# Patient Record
Sex: MALE
[De-identification: names, ages, dates, MRNs, and addresses within clinical notes are randomized per-mention and may not be internally consistent; named-entity substitution may affect disease eponyms.]

---

## 2018-03-05 ENCOUNTER — HOSPITAL ENCOUNTER (EMERGENCY)
Dept: HOSPITAL 82 - ED | Age: 58
Discharge: HOME | DRG: 552 | End: 2018-03-05
Payer: COMMERCIAL

## 2018-03-05 VITALS — WEIGHT: 271.17 LBS | HEIGHT: 67 IN | BODY MASS INDEX: 42.56 KG/M2

## 2018-03-05 VITALS — DIASTOLIC BLOOD PRESSURE: 85 MMHG | SYSTOLIC BLOOD PRESSURE: 133 MMHG
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
## 2018-03-05 DIAGNOSIS — M51.36: Primary | ICD-10-CM
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
## 2021-01-12 ENCOUNTER — HOSPITAL ENCOUNTER (OUTPATIENT)
Dept: HOSPITAL 82 - ED | Age: 61
Setting detail: OBSERVATION
LOS: 1 days | Discharge: HOME | DRG: 177 | End: 2021-01-13
Attending: INTERNAL MEDICINE | Admitting: INTERNAL MEDICINE
Payer: COMMERCIAL

## 2021-01-12 VITALS — HEIGHT: 67 IN | BODY MASS INDEX: 43.67 KG/M2 | WEIGHT: 278.25 LBS

## 2021-01-12 VITALS — DIASTOLIC BLOOD PRESSURE: 83 MMHG | SYSTOLIC BLOOD PRESSURE: 134 MMHG

## 2021-01-12 DIAGNOSIS — G47.30: ICD-10-CM

## 2021-01-12 DIAGNOSIS — U07.1: Primary | ICD-10-CM

## 2021-01-12 DIAGNOSIS — J12.82: ICD-10-CM

## 2021-01-12 DIAGNOSIS — I10: ICD-10-CM

## 2021-01-12 LAB
ALBUMIN SERPL-MCNC: 4.1 G/DL (ref 3.2–5)
ALP SERPL-CCNC: 58 U/L (ref 38–126)
ANION GAP SERPL CALCULATED.3IONS-SCNC: 12 MMOL/L
AST SERPL-CCNC: 48 U/L (ref 19–48)
BASOPHILS NFR BLD AUTO: 0 % (ref 0–3)
BILIRUB UR QL STRIP.AUTO: (no result)
BUN SERPL-MCNC: 15 MG/DL (ref 8–23)
BUN/CREAT SERPL: 20
CHLORIDE SERPL-SCNC: 102 MMOL/L (ref 95–108)
CO2 SERPL-SCNC: 25 MMOL/L (ref 22–30)
COLOR UR AUTO: (no result)
CREAT SERPL-MCNC: 0.8 MG/DL (ref 0.7–1.3)
EOSINOPHIL NFR BLD AUTO: 0 % (ref 0–8)
ERYTHROCYTE [DISTWIDTH] IN BLOOD BY AUTOMATED COUNT: 12.2 % (ref 11.5–15.5)
GLUCOSE UR STRIP.AUTO-MCNC: NEGATIVE MG/DL
HCT VFR BLD AUTO: 38.7 % (ref 39–50)
HGB BLD-MCNC: 13.3 G/DL (ref 14–18)
HGB UR QL STRIP.AUTO: (no result)
IMM GRANULOCYTES NFR BLD: 0.6 % (ref 0–5)
KETONES UR STRIP.AUTO-MCNC: 15 MG/DL
LEUKOCYTE ESTERASE UR QL STRIP.AUTO: NEGATIVE
LYMPHOCYTES NFR BLD: 17 % (ref 15–41)
MCH RBC QN AUTO: 31.7 PG  CALC (ref 26–32)
MCHC RBC AUTO-ENTMCNC: 34.4 G/DL CAL (ref 32–36)
MCV RBC AUTO: 92.4 FL  CALC (ref 80–100)
MONOCYTES NFR BLD AUTO: 6 % (ref 2–13)
MYOGLOBIN SERPL-MCNC: 183 NG/ML (ref 0–121)
NEUTROPHILS # BLD AUTO: 5.4 THOU/UL (ref 1.82–7.42)
NEUTROPHILS NFR BLD AUTO: 76 % (ref 42–76)
NITRITE UR QL STRIP.AUTO: NEGATIVE
PH UR STRIP.AUTO: 6 [PH] (ref 4.5–8)
PLATELET # BLD AUTO: 227 THOU/UL (ref 130–400)
POTASSIUM SERPL-SCNC: 3.8 MMOL/L (ref 3.5–5.1)
PROT SERPL-MCNC: 8 G/DL (ref 6.3–8.2)
PROT UR QL STRIP.AUTO: 100 MG/DL
RBC # BLD AUTO: 4.19 MILL/UL (ref 4.7–6.1)
RBC #/AREA URNS HPF: (no result) RBC/HPF (ref 0–5)
SERVICE CMNT 15-IMP: (no result) HPF
SODIUM SERPL-SCNC: 136 MMOL/L (ref 137–146)
SP GR UR STRIP.AUTO: >=1.03
SPERM URNS QL MICRO: (no result) HPF
UROBILINOGEN UR QL STRIP.AUTO: 2 E.U./DL
WBC #/AREA URNS HPF: (no result) WBC/HPF (ref 0–5)

## 2021-01-12 PROCEDURE — G0378 HOSPITAL OBSERVATION PER HR: HCPCS

## 2021-01-13 VITALS — DIASTOLIC BLOOD PRESSURE: 84 MMHG | SYSTOLIC BLOOD PRESSURE: 133 MMHG

## 2021-01-13 VITALS — SYSTOLIC BLOOD PRESSURE: 119 MMHG | DIASTOLIC BLOOD PRESSURE: 70 MMHG

## 2022-02-19 ENCOUNTER — HOSPITAL ENCOUNTER (OUTPATIENT)
Dept: HOSPITAL 82 - ED | Age: 62
Setting detail: OBSERVATION
LOS: 5 days | Discharge: HOME | DRG: 418 | End: 2022-02-24
Attending: STUDENT IN AN ORGANIZED HEALTH CARE EDUCATION/TRAINING PROGRAM | Admitting: STUDENT IN AN ORGANIZED HEALTH CARE EDUCATION/TRAINING PROGRAM
Payer: SELF-PAY

## 2022-02-19 VITALS — HEIGHT: 67 IN | WEIGHT: 273.37 LBS | BODY MASS INDEX: 42.91 KG/M2

## 2022-02-19 VITALS — SYSTOLIC BLOOD PRESSURE: 120 MMHG | DIASTOLIC BLOOD PRESSURE: 76 MMHG

## 2022-02-19 DIAGNOSIS — G47.30: ICD-10-CM

## 2022-02-19 DIAGNOSIS — I95.9: ICD-10-CM

## 2022-02-19 DIAGNOSIS — K81.0: Primary | ICD-10-CM

## 2022-02-19 DIAGNOSIS — Z20.822: ICD-10-CM

## 2022-02-19 DIAGNOSIS — E03.9: ICD-10-CM

## 2022-02-19 DIAGNOSIS — I10: ICD-10-CM

## 2022-02-19 DIAGNOSIS — E66.9: ICD-10-CM

## 2022-02-19 LAB
ALBUMIN SERPL-MCNC: 4.5 G/DL (ref 3.2–5)
ALP SERPL-CCNC: 67 U/L (ref 38–126)
AMYLASE SERPL-CCNC: 118 U/L (ref 30–110)
ANION GAP SERPL CALCULATED.3IONS-SCNC: 15 MMOL/L
AST SERPL-CCNC: 30 U/L (ref 19–48)
BASOPHILS NFR BLD AUTO: 0 % (ref 0–3)
BILIRUB UR QL STRIP.AUTO: NEGATIVE
BUN SERPL-MCNC: 12 MG/DL (ref 8–23)
BUN/CREAT SERPL: 15
CHLORIDE SERPL-SCNC: 103 MMOL/L (ref 95–108)
CO2 SERPL-SCNC: 25 MMOL/L (ref 22–30)
COLOR UR AUTO: YELLOW
CREAT SERPL-MCNC: 0.8 MG/DL (ref 0.7–1.3)
EOSINOPHIL NFR BLD AUTO: 0 % (ref 0–8)
ERYTHROCYTE [DISTWIDTH] IN BLOOD BY AUTOMATED COUNT: 12.5 % (ref 11.5–15.5)
GLUCOSE UR STRIP.AUTO-MCNC: NEGATIVE MG/DL
HCT VFR BLD AUTO: 46.4 % (ref 39–50)
HGB BLD-MCNC: 15.3 G/DL (ref 14–18)
HGB UR QL STRIP.AUTO: (no result)
IMM GRANULOCYTES NFR BLD: 0.2 % (ref 0–5)
KETONES UR STRIP.AUTO-MCNC: NEGATIVE MG/DL
LEUKOCYTE ESTERASE UR QL STRIP.AUTO: NEGATIVE
LIPASE SERPL-CCNC: 150 U/L (ref 23–300)
LYMPHOCYTES NFR BLD: 9 % (ref 15–41)
MCH RBC QN AUTO: 31.9 PG  CALC (ref 26–32)
MCHC RBC AUTO-ENTMCNC: 33 G/DL CAL (ref 32–36)
MCV RBC AUTO: 96.9 FL  CALC (ref 80–100)
MONOCYTES NFR BLD AUTO: 7 % (ref 2–13)
NEUTROPHILS # BLD AUTO: 15.11 THOU/UL (ref 1.82–7.42)
NEUTROPHILS NFR BLD AUTO: 84 % (ref 42–76)
NITRITE UR QL STRIP.AUTO: NEGATIVE
PH UR STRIP.AUTO: 6.5 [PH] (ref 4.5–8)
PLATELET # BLD AUTO: 235 THOU/UL (ref 130–400)
POTASSIUM SERPL-SCNC: 4 MMOL/L (ref 3.5–5.1)
PROT SERPL-MCNC: 9 G/DL (ref 6.3–8.2)
PROT UR QL STRIP.AUTO: (no result) MG/DL
RBC # BLD AUTO: 4.79 MILL/UL (ref 4.7–6.1)
RBC #/AREA URNS HPF: (no result) RBC/HPF (ref 0–5)
SODIUM SERPL-SCNC: 139 MMOL/L (ref 137–146)
SP GR UR STRIP.AUTO: 1.01
UROBILINOGEN UR QL STRIP.AUTO: 1 E.U./DL
WBC #/AREA URNS HPF: (no result) WBC/HPF (ref 0–5)

## 2022-02-19 PROCEDURE — A9537 TC99M MEBROFENIN: HCPCS

## 2022-02-19 PROCEDURE — G0378 HOSPITAL OBSERVATION PER HR: HCPCS

## 2022-02-19 NOTE — NUR
PATIENT TO ROOM VIA WHEELCHAIR ACCOMPANIED BY ER NURSE. PATIENT TRANSFERRED TO
BED WITH MINIMAL ASSISTANCE. PT IS ALERT AND ORIENTED X3. ADMISSION ASSESSMENT
COMPLETED AT THIS TIME. IV PATENT X1. ORIENTED PATIENT TO ROOM AND UNIT. CALL
LIGHT IN REACH. WILL CONTINUE TO MONITOR.

## 2022-02-20 VITALS — DIASTOLIC BLOOD PRESSURE: 33 MMHG | SYSTOLIC BLOOD PRESSURE: 87 MMHG

## 2022-02-20 VITALS — DIASTOLIC BLOOD PRESSURE: 59 MMHG | SYSTOLIC BLOOD PRESSURE: 108 MMHG

## 2022-02-20 VITALS — DIASTOLIC BLOOD PRESSURE: 73 MMHG | SYSTOLIC BLOOD PRESSURE: 117 MMHG

## 2022-02-20 LAB
ANION GAP SERPL CALCULATED.3IONS-SCNC: 14 MMOL/L
BUN SERPL-MCNC: 15 MG/DL (ref 8–23)
BUN/CREAT SERPL: 18
CHLORIDE SERPL-SCNC: 103 MMOL/L (ref 95–108)
CO2 SERPL-SCNC: 26 MMOL/L (ref 22–30)
CREAT SERPL-MCNC: 0.9 MG/DL (ref 0.7–1.3)
ERYTHROCYTE [DISTWIDTH] IN BLOOD BY AUTOMATED COUNT: 12.6 % (ref 11.5–15.5)
HCT VFR BLD AUTO: 42.1 % (ref 39–50)
HGB BLD-MCNC: 13.6 G/DL (ref 14–18)
MAGNESIUM SERPL-MCNC: 2.2 MG/DL (ref 1.6–2.3)
MCH RBC QN AUTO: 31.6 PG  CALC (ref 26–32)
MCHC RBC AUTO-ENTMCNC: 32.3 G/DL CAL (ref 32–36)
MCV RBC AUTO: 97.9 FL  CALC (ref 80–100)
PLATELET # BLD AUTO: 235 THOU/UL (ref 130–400)
POTASSIUM SERPL-SCNC: 4 MMOL/L (ref 3.5–5.1)
RBC # BLD AUTO: 4.3 MILL/UL (ref 4.7–6.1)
SODIUM SERPL-SCNC: 139 MMOL/L (ref 137–146)

## 2022-02-20 NOTE — NUR
SHIFT CHANGE REPORT, PT AWAKE ALERT AND ORIENTED, C/O ACHING ABD PAIN @ 3/10
TO RIGHT ABD, IVF INFUSING, CALL BELL IN REACH AND BED LOCKED IN LOWEST
POSITION.

## 2022-02-20 NOTE — NUR
PT IC FLUIDS PAUSED, PT WOULD LIKE TO TAKE A SHOWER. IV SITE COVERED WITH
AQUAGUARD, BATHROOM SET UP, TOWELS PROVIDED, AND TOILETRIES. ORITNETED PT TO
BATHROOM CALL LIGHT SYSTEM AND ISTRUCTED TO CALL IF HELP IS NEEDED.

## 2022-02-20 NOTE — NUR
PT RESTING IN BED WITH EYES CLOSED, RESPIRATIONS ARE EQUAL AND NON-LABORED, NO
S/SX OF DISTRESS OR DISCOMFORT NOTED, CALL LIGHT WITHIN REACH, WILL CONTINUE
TO MONITOR.

## 2022-02-21 VITALS — DIASTOLIC BLOOD PRESSURE: 75 MMHG | SYSTOLIC BLOOD PRESSURE: 95 MMHG

## 2022-02-21 VITALS — DIASTOLIC BLOOD PRESSURE: 63 MMHG | SYSTOLIC BLOOD PRESSURE: 115 MMHG

## 2022-02-21 VITALS — SYSTOLIC BLOOD PRESSURE: 125 MMHG | DIASTOLIC BLOOD PRESSURE: 71 MMHG

## 2022-02-21 LAB
ANION GAP SERPL CALCULATED.3IONS-SCNC: 13 MMOL/L
BUN SERPL-MCNC: 16 MG/DL (ref 8–23)
BUN/CREAT SERPL: 20
CHLORIDE SERPL-SCNC: 108 MMOL/L (ref 95–108)
CO2 SERPL-SCNC: 23 MMOL/L (ref 22–30)
CREAT SERPL-MCNC: 0.8 MG/DL (ref 0.7–1.3)
ERYTHROCYTE [DISTWIDTH] IN BLOOD BY AUTOMATED COUNT: 12.4 % (ref 11.5–15.5)
HCT VFR BLD AUTO: 44.5 % (ref 39–50)
HGB BLD-MCNC: 14.9 G/DL (ref 14–18)
MAGNESIUM SERPL-MCNC: 2.4 MG/DL (ref 1.6–2.3)
MCH RBC QN AUTO: 32.6 PG  CALC (ref 26–32)
MCHC RBC AUTO-ENTMCNC: 33.5 G/DL CAL (ref 32–36)
MCV RBC AUTO: 97.4 FL  CALC (ref 80–100)
PLATELET # BLD AUTO: 187 THOU/UL (ref 130–400)
POTASSIUM SERPL-SCNC: 3.8 MMOL/L (ref 3.5–5.1)
RBC # BLD AUTO: 4.57 MILL/UL (ref 4.7–6.1)
SODIUM SERPL-SCNC: 140 MMOL/L (ref 137–146)

## 2022-02-21 NOTE — NUR
RECEIVE REPORT FROM ARASELI PERALTA. PATIENT ALERT AND ORIENTED. NO RESPIRATORY
DISTRESS, NO PAIN AT THIS TIME. EDUCATED PATIENT ABOUT MEDICATIONS AND PLAN OF
CARE. PATIENT REFER UNDERSTAND.

## 2022-02-21 NOTE — NUR
PATIENT RESTING IN BED QUIETLY. NO COMPLAINTS VOICED AT THIS TIME. NEW BAG OF
IVF HUNG. IV ZOSYN HUNG PER ORDERS AS WELL. CALL LIGHT AND BELONGINGS REMAIN
IN REACH.

## 2022-02-21 NOTE — NUR
ANTIBIOTIC AND NEW BAG OF NORMAL SALINE HUNG AT THIS TIME. PT DENIES ANY
CURRENT PAIN, EDUCATION PROVIDED REGARDING THE USE OF ANTIBIOTICS. CALL LIGHT
AND BEDSIDE TABLE WITHIN REACH.

## 2022-02-21 NOTE — NUR
PATIENT ALERT AND ORIENTED. Bengali SPEAKING BUT IS ABLE TO UNDERSTAND SOME
ENGLISH. ASSESSMENT COMPLETE. RATES PAIN OF 2 BUT REFUSED PAIN MEDICATION.
PAIN LOCATED LOWER RIGHT ABDOMEN AND RADIATES TO THE BACK. NO SIGNS OF
DISTRESS. PATIENT REMAINS NPO PER ORDERS. IV FLUIDS REMAIN INFUSING PER
ORDERS. CALL LIGHT AND BELONGINGS REMAIN IN REACH.

## 2022-02-22 VITALS — SYSTOLIC BLOOD PRESSURE: 140 MMHG | DIASTOLIC BLOOD PRESSURE: 77 MMHG

## 2022-02-22 VITALS — DIASTOLIC BLOOD PRESSURE: 63 MMHG | SYSTOLIC BLOOD PRESSURE: 118 MMHG

## 2022-02-22 LAB
ANION GAP SERPL CALCULATED.3IONS-SCNC: 12 MMOL/L
BUN SERPL-MCNC: 12 MG/DL (ref 8–23)
BUN/CREAT SERPL: 17
CHLORIDE SERPL-SCNC: 108 MMOL/L (ref 95–108)
CO2 SERPL-SCNC: 22 MMOL/L (ref 22–30)
CREAT SERPL-MCNC: 0.7 MG/DL (ref 0.7–1.3)
ERYTHROCYTE [DISTWIDTH] IN BLOOD BY AUTOMATED COUNT: 12.2 % (ref 11.5–15.5)
HCT VFR BLD AUTO: 38.5 % (ref 39–50)
HGB BLD-MCNC: 12.7 G/DL (ref 14–18)
MCH RBC QN AUTO: 32.1 PG  CALC (ref 26–32)
MCHC RBC AUTO-ENTMCNC: 33 G/DL CAL (ref 32–36)
MCV RBC AUTO: 97.2 FL  CALC (ref 80–100)
PLATELET # BLD AUTO: 241 THOU/UL (ref 130–400)
POTASSIUM SERPL-SCNC: 3.9 MMOL/L (ref 3.5–5.1)
RBC # BLD AUTO: 3.96 MILL/UL (ref 4.7–6.1)
SODIUM SERPL-SCNC: 139 MMOL/L (ref 137–146)

## 2022-02-22 NOTE — NUR
PT SITTING IN BED. A&O X3. NO DISTRESS NOTED. PT DENIES ANY PAIN OR N&V. DOES
STATE PAIN IS BETTER COMPARED TO YESTERDAY ALTHOUGH STILL C/O OF SLIGHT
DISCOMFORT IN RLQ. CLEAR BREATH SOUNDS UPON AUSUCLTATION. ACTIVE BOWEL SOUNDS
X4 QUADRANTS. IV HEALTHY AND PATENT WITH IVF INFUSING PER MAR ORDER. NO OTHER
NEEDS AT THIS TIME. ASSESSMENT COMPLETED. DISCUSSED POC. CALL LIGHT WITHIN
REACH. Quality 431: Preventive Care And Screening: Unhealthy Alcohol Use - Screening: Patient screened for unhealthy alcohol use using a single question and scores less than 2 times per year Quality 110: Preventive Care And Screening: Influenza Immunization: Influenza Immunization previously received during influenza season Quality 226: Preventive Care And Screening: Tobacco Use: Screening And Cessation Intervention: Patient screened for tobacco use and is an ex/non-smoker Detail Level: Detailed Quality 130: Documentation Of Current Medications In The Medical Record: Current Medications Documented

## 2022-02-22 NOTE — NUR
PATIENT RESTING IN BED ON HIS RIGHT SIDE. DENIES ANY PAIN. NO SIGNS OF
DISTRESS. ASSESSMENT COMPLETE. NO BOWEL MOVEMENT NOTED. NO COMPLAINTS OF
NAUSEA. NO TENDERNESS TO ABDOMEN. CALL LIGHT AND BELONGINGS REMAIN IN REACH.

## 2022-02-22 NOTE — NUR
PT SITTING ON THE SIDE OF THE BED EATING DINNER. PT TOLERATING DINNER WELL
DENIES ANY PAIN. CALL LIGHT WITHIN REACH.

## 2022-02-22 NOTE — NUR
RESTING IN BED. NO COMPLAINTS VOICED AT THIS TIME. CALL LIGHT AND BELONGINGS
REMAIN IN PATIENTS REACH.

## 2022-02-22 NOTE — NUR
PER GLORIA IN [place], PT ALLOWED TO HAVE BREAKFAST IF PERMITTED; NUCLEAR MED DOSE
NOT AVAILABLE AT THIS TIME; PT TO HAVE SCAN COMPLETED AT OR AFTER 2 PM. PT
REQUIRED TO BE NPO 4-6 HOURS PRIOR TO SCAN; NO PAIN MEDICATION ALSO FOR 4-6
HOURS PRIOR TO SCAN.

## 2022-02-23 VITALS — SYSTOLIC BLOOD PRESSURE: 133 MMHG | DIASTOLIC BLOOD PRESSURE: 78 MMHG

## 2022-02-23 VITALS — SYSTOLIC BLOOD PRESSURE: 134 MMHG | DIASTOLIC BLOOD PRESSURE: 74 MMHG

## 2022-02-23 VITALS — SYSTOLIC BLOOD PRESSURE: 148 MMHG | DIASTOLIC BLOOD PRESSURE: 80 MMHG

## 2022-02-23 VITALS — SYSTOLIC BLOOD PRESSURE: 132 MMHG | DIASTOLIC BLOOD PRESSURE: 79 MMHG

## 2022-02-23 NOTE — NUR
PATIENT RESTING IN BED. NO COMPLAINTS VOICED AT THIS TIME. NO SIGNS OF
DISTRESS. CALL LIGHT AND BELONGINGS REMAIN IN REACH.

## 2022-02-23 NOTE — NUR
PATIENT RESTING IN BED. NO COMPLAINTS. IV ABT HUNG PER ORDERS. PATIENT
AMBULATED TO RESTROOM WITHOUT DIFFICULTY. CALL LIGHT AND BELONGINGS REMAIN IN
REACH.

## 2022-02-23 NOTE — NUR
DR AGUILAR ROUNDED AND DISCUSED OPTIONS FOR TREATMENT WITH PT,  IN
ROOM AND TRANSLATED, PT STATED UNDERSTANDING AND DECIDED TO HAVE SURGERY DONE
IN AM, WILL CONTINUE TO MONITOR.

## 2022-02-23 NOTE — NUR
SHIFT CHANGE REPORT, PT AWAKE ALERT AND ORIENTED, DENIES PAIN, IVF INFUSING,
ALL NEEDS ADDRESSED, CALL BELL IN REACH AND BED LOCKED IN LOWEST POSITION.

## 2022-02-23 NOTE — NUR
DR AGUILAR JUST CALLED [facility] STAFF HIDA SCAN WAS POSITIVE AND IF PT WANTS
TO HAVE SURGERY HERE IN AM HE WILL HAVE IT DONE BUT IF HE WANTS TO GO HOME AND
SEE HIM IN OFICE HE HAS THAT OPTION, PT INFORMED OF PLAN AND DECIDED TO GO
HOME, MONITOR/MODIFY HIS DIET AND FOLLOW UP AS INSTRUCTED/NEEDED.

## 2022-02-24 VITALS — DIASTOLIC BLOOD PRESSURE: 78 MMHG | SYSTOLIC BLOOD PRESSURE: 133 MMHG

## 2022-02-24 VITALS — SYSTOLIC BLOOD PRESSURE: 134 MMHG | DIASTOLIC BLOOD PRESSURE: 69 MMHG

## 2022-02-24 VITALS — DIASTOLIC BLOOD PRESSURE: 75 MMHG | SYSTOLIC BLOOD PRESSURE: 131 MMHG

## 2022-02-24 VITALS — DIASTOLIC BLOOD PRESSURE: 76 MMHG | SYSTOLIC BLOOD PRESSURE: 134 MMHG

## 2022-02-24 VITALS — DIASTOLIC BLOOD PRESSURE: 71 MMHG | SYSTOLIC BLOOD PRESSURE: 136 MMHG

## 2022-02-24 VITALS — DIASTOLIC BLOOD PRESSURE: 82 MMHG | SYSTOLIC BLOOD PRESSURE: 150 MMHG

## 2022-02-24 VITALS — DIASTOLIC BLOOD PRESSURE: 80 MMHG | SYSTOLIC BLOOD PRESSURE: 130 MMHG

## 2022-02-24 VITALS — DIASTOLIC BLOOD PRESSURE: 70 MMHG | SYSTOLIC BLOOD PRESSURE: 134 MMHG

## 2022-02-24 VITALS — DIASTOLIC BLOOD PRESSURE: 79 MMHG | SYSTOLIC BLOOD PRESSURE: 142 MMHG

## 2022-02-24 LAB
ANION GAP SERPL CALCULATED.3IONS-SCNC: 11 MMOL/L
BUN SERPL-MCNC: 9 MG/DL (ref 8–23)
BUN/CREAT SERPL: 13
CHLORIDE SERPL-SCNC: 108 MMOL/L (ref 95–108)
CO2 SERPL-SCNC: 23 MMOL/L (ref 22–30)
CREAT SERPL-MCNC: 0.7 MG/DL (ref 0.7–1.3)
ERYTHROCYTE [DISTWIDTH] IN BLOOD BY AUTOMATED COUNT: 12.1 % (ref 11.5–15.5)
HCT VFR BLD AUTO: 37.3 % (ref 39–50)
HGB BLD-MCNC: 12.7 G/DL (ref 14–18)
MAGNESIUM SERPL-MCNC: 1.9 MG/DL (ref 1.6–2.3)
MCH RBC QN AUTO: 32.4 PG  CALC (ref 26–32)
MCHC RBC AUTO-ENTMCNC: 34 G/DL CAL (ref 32–36)
MCV RBC AUTO: 95.2 FL  CALC (ref 80–100)
PLATELET # BLD AUTO: 264 THOU/UL (ref 130–400)
POTASSIUM SERPL-SCNC: 4 MMOL/L (ref 3.5–5.1)
RBC # BLD AUTO: 3.92 MILL/UL (ref 4.7–6.1)
SODIUM SERPL-SCNC: 137 MMOL/L (ref 137–146)

## 2022-02-24 PROCEDURE — 0FT44ZZ RESECTION OF GALLBLADDER, PERCUTANEOUS ENDOSCOPIC APPROACH: ICD-10-PCS | Performed by: SURGERY

## 2022-02-24 NOTE — NUR
PT UP TO BATHROOM TO VOID. NO NEEDS AT THIS TIME. PT TOLERATED WELL ABLE TO
DRINK WATER WITH NO DIFFICULTY. CALL LIGHT WITHIN REACH.

## 2022-02-24 NOTE — NUR
[facility]  IV REMAINS
HEALTHY AND PATENT.

## 2022-02-24 NOTE — NUR
PT LAYING IN BED. A&O X3. NO DISTRESS NOTED. PT DENIES ANY PAIN AT THIS TIME.
UPDATED PT ON SURGICAL PLAN ALONG WITH  TIME. CLEAR BREATH SOUNDS UPON
AUSCULTATION. ACTIVE BOWEL SOUNDS X4 QUADRANTS. IV HEALTHY AND PATENT WITH IVF
INFUSING PER MAR ORDER. ASSESSMENT COMPLETED. DISCUSSED POC. CALL LIGHT WITHIN
REACH.

## 2022-02-24 NOTE — NUR
PT ARRIVED FROM OR IN STABLE CONDITION VIA STRETCHER ON RA., NEW IV SITE #20G
RH WITH IVF AND ABX RUNNING. BOTH REINITIATED PER MAR ORDERS. PT AWAKE AND
ALERT, DENIES ANY PAIN. X4 LAPAROSCOPIC INCISIONS NOTED WITH DERMABOND.